# Patient Record
Sex: FEMALE | Race: WHITE | Employment: PART TIME | URBAN - METROPOLITAN AREA
[De-identification: names, ages, dates, MRNs, and addresses within clinical notes are randomized per-mention and may not be internally consistent; named-entity substitution may affect disease eponyms.]

---

## 2020-11-05 ENCOUNTER — HOSPITAL ENCOUNTER (OUTPATIENT)
Dept: GENERAL RADIOLOGY | Age: 50
Discharge: HOME | End: 2020-11-05
Payer: COMMERCIAL

## 2020-11-05 ENCOUNTER — TELEPHONE (OUTPATIENT)
Dept: FAMILY MEDICINE CLINIC | Age: 50
End: 2020-11-05

## 2020-11-05 DIAGNOSIS — M79.671 RIGHT FOOT PAIN: ICD-10-CM

## 2020-11-05 DIAGNOSIS — M79.671 RIGHT FOOT PAIN: Primary | ICD-10-CM

## 2020-11-05 PROCEDURE — 73630 X-RAY EXAM OF FOOT: CPT

## 2020-11-05 NOTE — TELEPHONE ENCOUNTER
Pt is friend of mine, showed me her foot yesteday. Had dropped something heavy on right foot about 1.5 weeks ago. swole up immediately. Still able to walk but hurting more after doing a hike this week. Foot was very bruised, mid-foot very swollen. Point tenderness over 4th metatarsal head.  Put in xray order

## 2021-07-09 LAB — MAMMOGRAPHY, EXTERNAL: NORMAL

## 2025-04-16 ENCOUNTER — TELEPHONE (OUTPATIENT)
Age: 55
End: 2025-04-16

## 2025-04-16 NOTE — TELEPHONE ENCOUNTER
Left VM for pt to call office back. If pt calls back please inform her as of right now Dr. Davis next available new pt appt is April 2026.-TM 4/16/25.

## 2025-04-16 NOTE — TELEPHONE ENCOUNTER
----- Message from Gerda FONTANEZ sent at 4/15/2025  4:20 PM EDT -----  Regarding: FW: [facility] Appointment Request    ----- Message -----  From: Josefa Kierachiqiu Latasha Mayaileen  Sent: 4/15/2025  12:49 PM EDT  To: [facility] Clinical Staff  Subject: [facility] Appointment Request                          [facility] Appointment Request    Patient needs appointment for [facility] Appointment Type: New Patient.    Patient Requested Dates(s):As soon as possible  Patient Requested Time:Any time of the day  Provider Name:Peggy Davis MD    Reason for Appointment Request: New Patient - Requested Provider unavailable  --------------------------------------------------------------------------------------------------------------------------    Relationship to Patient: Self     Call Back Information: OK to leave message on voicemail  Preferred Call Back Number: Phone [phone]

## 2025-05-05 ENCOUNTER — TELEPHONE (OUTPATIENT)
Age: 55
End: 2025-05-05

## 2025-05-05 NOTE — TELEPHONE ENCOUNTER
----- Message from Lupe MAYA sent at 5/2/2025 10:46 AM EDT -----  Regarding: FW: [facility] Appointment Request    ----- Message -----  From: Fatou Lucas  Sent: 5/1/2025   4:58 PM EDT  To: [facility] Clinical Staff  Subject: [facility] Appointment Request                          [facility] Appointment Request    Patient needs appointment for [facility] Appointment Type: New Patient.    Patient Requested Dates(s):Any day  Patient Requested Time:any available time  Provider Name:Peggy Davis MD    Reason for Appointment Request: New Patient - No appointments available during search  --------------------------------------------------------------------------------------------------------------------------    Relationship to Patient: Self     Call Back Information: OK to leave message on voicemail  Preferred Call Back Number: Phone [phone]

## 2025-05-05 NOTE — TELEPHONE ENCOUNTER
Spoke to pt and informed her unfortunately Dr. Davis is not accepting new pts. Offer pt an appt with another provider but she declined at the moment and said will just check online and call back.-TM 5/5/25.

## 2025-05-09 ENCOUNTER — TELEPHONE (OUTPATIENT)
Dept: PRIMARY CARE CLINIC | Facility: CLINIC | Age: 55
End: 2025-05-09

## 2025-05-09 NOTE — TELEPHONE ENCOUNTER
----- Message from Dani SHIN sent at 5/8/2025  2:26 PM EDT -----  Regarding: [facility] Appointment Request  [facility] Appointment Request    Patient needs appointment for [facility] Appointment Type: New Patient.    Patient Requested Dates(s): First Available  Patient Requested Time: Anytime  Provider Name: Magaly oByd DO    Reason for Appointment Request: New Patient - No appointments available during search  --------------------------------------------------------------------------------------------------------------------------    Relationship to Patient: Self     Call Back Information: OK to leave message on voicemail  Preferred Call Back Number: Phone: [phone]